# Patient Record
Sex: MALE | Race: WHITE | Employment: STUDENT | URBAN - METROPOLITAN AREA
[De-identification: names, ages, dates, MRNs, and addresses within clinical notes are randomized per-mention and may not be internally consistent; named-entity substitution may affect disease eponyms.]

---
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
## 2022-03-03 ENCOUNTER — OFFICE VISIT (OUTPATIENT)
Dept: FAMILY MEDICINE CLINIC | Facility: CLINIC | Age: 22
End: 2022-03-03
Payer: MEDICAID

## 2022-03-03 VITALS — BODY MASS INDEX: 27.09 KG/M2 | TEMPERATURE: 98 F | WEIGHT: 200 LBS | HEIGHT: 72 IN | RESPIRATION RATE: 16 BRPM

## 2022-03-03 DIAGNOSIS — L42 PITYRIASIS ROSEA: Primary | ICD-10-CM

## 2022-03-03 PROCEDURE — 3008F BODY MASS INDEX DOCD: CPT | Performed by: NURSE PRACTITIONER

## 2022-03-03 PROCEDURE — 99203 OFFICE O/P NEW LOW 30 MIN: CPT | Performed by: NURSE PRACTITIONER

## 2022-03-03 RX ORDER — VALACYCLOVIR HYDROCHLORIDE 1 G/1
1000 TABLET, FILM COATED ORAL EVERY 12 HOURS SCHEDULED
Qty: 14 TABLET | Refills: 0 | Status: SHIPPED | OUTPATIENT
Start: 2022-03-03 | End: 2022-03-10

## 2022-03-17 ENCOUNTER — OFFICE VISIT (OUTPATIENT)
Dept: FAMILY MEDICINE CLINIC | Facility: CLINIC | Age: 22
End: 2022-03-17
Payer: MEDICAID

## 2022-03-17 VITALS
WEIGHT: 200 LBS | SYSTOLIC BLOOD PRESSURE: 146 MMHG | HEART RATE: 96 BPM | HEIGHT: 74 IN | TEMPERATURE: 99 F | RESPIRATION RATE: 18 BRPM | BODY MASS INDEX: 25.67 KG/M2 | DIASTOLIC BLOOD PRESSURE: 98 MMHG | OXYGEN SATURATION: 98 %

## 2022-03-17 DIAGNOSIS — L42 PITYRIASIS ROSEA: Primary | ICD-10-CM

## 2022-03-17 PROCEDURE — 99213 OFFICE O/P EST LOW 20 MIN: CPT | Performed by: NURSE PRACTITIONER

## 2022-03-17 PROCEDURE — 3080F DIAST BP >= 90 MM HG: CPT | Performed by: NURSE PRACTITIONER

## 2022-03-17 PROCEDURE — 3008F BODY MASS INDEX DOCD: CPT | Performed by: NURSE PRACTITIONER

## 2022-03-17 PROCEDURE — 3077F SYST BP >= 140 MM HG: CPT | Performed by: NURSE PRACTITIONER

## 2025-01-25 ENCOUNTER — APPOINTMENT (OUTPATIENT)
Dept: ULTRASOUND IMAGING | Facility: HOSPITAL | Age: 25
End: 2025-01-25
Attending: EMERGENCY MEDICINE

## 2025-01-25 ENCOUNTER — HOSPITAL ENCOUNTER (EMERGENCY)
Facility: HOSPITAL | Age: 25
Discharge: HOME | End: 2025-01-25
Attending: EMERGENCY MEDICINE

## 2025-01-25 VITALS
WEIGHT: 200 LBS | OXYGEN SATURATION: 99 % | RESPIRATION RATE: 20 BRPM | BODY MASS INDEX: 25.67 KG/M2 | SYSTOLIC BLOOD PRESSURE: 138 MMHG | HEART RATE: 76 BPM | DIASTOLIC BLOOD PRESSURE: 68 MMHG | HEIGHT: 74 IN | TEMPERATURE: 98 F

## 2025-01-25 DIAGNOSIS — N45.3 EPIDIDYMO-ORCHITIS: Primary | ICD-10-CM

## 2025-01-25 LAB
BILIRUB UR QL STRIP.AUTO: NEGATIVE
CLARITY UR REFRACT.AUTO: CLEAR
GLUCOSE UR STRIP.AUTO-MCNC: NORMAL MG/DL
KETONES UR STRIP.AUTO-MCNC: NEGATIVE MG/DL
LEUKOCYTE ESTERASE UR QL STRIP.AUTO: NEGATIVE
NITRITE UR QL STRIP.AUTO: NEGATIVE
PH UR STRIP.AUTO: 6.5 [PH] (ref 5–8)
PROT UR STRIP.AUTO-MCNC: NEGATIVE MG/DL
RBC UR QL AUTO: NEGATIVE
SP GR UR STRIP.AUTO: 1.01 (ref 1–1.03)
UROBILINOGEN UR STRIP.AUTO-MCNC: NORMAL MG/DL

## 2025-01-25 PROCEDURE — 96372 THER/PROPH/DIAG INJ SC/IM: CPT

## 2025-01-25 PROCEDURE — 81003 URINALYSIS AUTO W/O SCOPE: CPT | Performed by: EMERGENCY MEDICINE

## 2025-01-25 PROCEDURE — 76870 US EXAM SCROTUM: CPT | Performed by: EMERGENCY MEDICINE

## 2025-01-25 PROCEDURE — 99284 EMERGENCY DEPT VISIT MOD MDM: CPT

## 2025-01-25 PROCEDURE — 93975 VASCULAR STUDY: CPT | Performed by: EMERGENCY MEDICINE

## 2025-01-25 RX ORDER — DOXYCYCLINE 100 MG/1
100 CAPSULE ORAL 2 TIMES DAILY
Qty: 20 CAPSULE | Refills: 0 | Status: SHIPPED | OUTPATIENT
Start: 2025-01-25 | End: 2025-02-04

## 2025-01-25 NOTE — ED PROVIDER NOTES
Patient Seen in: [facility] Emergency Department      History     Chief Complaint   Patient presents with    Eval-G     Stated Complaint: Testicular pain    Subjective:   HPI      This is a 24-year-old male no significant past medical history who presents with left testicle pain which she states has been present for 2 weeks was got increasingly worse over the last week.  No penile discharge.  No dysuria.  No fevers.  He has not been sexually active for 2 years.  No history of STDs.  He has not seen anybody for the symptoms.  He presents here today for further evaluation.    Objective:     History reviewed. No pertinent past medical history.           History reviewed. No pertinent surgical history.             Social History     Socioeconomic History    Marital status: Single   Tobacco Use    Smoking status: Never    Smokeless tobacco: Never   Vaping Use    Vaping status: Never Used   Substance and Sexual Activity    Alcohol use: Never    Drug use: Yes     Types: Cannabis                  Physical Exam     ED Triage Vitals   BP 01/25/25 0953 150/90   Pulse 01/25/25 0952 81   Resp 01/25/25 0952 20   Temp 01/25/25 0952 97.8 °F (36.6 °C)   Temp src 01/25/25 0952 Oral   SpO2 01/25/25 0952 98 %   O2 Device 01/25/25 0952 None (Room air)       Current Vitals:   Vital Signs  BP: 150/90  Pulse: 81  Resp: 20  Temp: 97.8 °F (36.6 °C)  Temp src: Oral    Oxygen Therapy  SpO2: 98 %  O2 Device: None (Room air)        Physical Exam  GENERAL: Awake, alert oriented x3, nontoxic appearing.   SKIN: Normal, warm, and dry.  ABDOMEN: Soft, nontender and nondistended. Normoactive bowel sounds. No rebound. No guarding.   : Uncircumcised male.  Left testicle lies slightly lower than right.  There is tenderness to palpation directly over the mid left testicle.  No pain to palpation of right testicle.  No penile discharge.  No inguinal lymphadenopathy.  EXTREMITIES: Warm with brisk capillary refill.         ED Course     Labs Reviewed    URINALYSIS WITH CULTURE REFLEX          US SCROTUM W/ DOPPLER (CPT=93975/04857)    Result Date: 1/25/2025  PROCEDURE:  US SCROTUM W/ DOPPLER (CPT=93975/85464)  COMPARISON:  None.  INDICATIONS:  Testicular pain  TECHNIQUE:  Real time grey scale ultrasound was performed of the scrotal contents including the testicles, epididymis, spermatic cord, and scrotal wall. A duplex scan with B-mode, Doppler color flow, and spectral analysis were also performed.  PATIENT STATED HISTORY: (As transcribed by Technologist)     FINDINGS:  TESTES:  Right testis measures 4.9 x 2.6 x 2.4 cm.  Left testis measures 4.8 x 2.6 x 2.5 cm.  There is asymmetric increased flow to the left testis in relation to the right concerning for epididymal orchitis. EPIDIDYMIS:  Hyperemic left epididymis. HYDROCELE:  Small left-sided hydrocele VARICOCELE:  Left-sided varicocele            CONCLUSION:  Left-sided epididymal orchitis with small left hydrocele.  No evidence of testicular torsion.  Left-sided varicocele.   LOCATION:  [facility]    Dictated by (CST): Melissa Awad MD on 1/25/2025 at 11:14 AM     Finalized by (CST): Melissa Awad MD on 1/25/2025 at 11:16 AM              MDM          This is a 24-year-old male no significant past medical history who presents with left testicle pain which she states has been present for 2 weeks was got increasingly worse over the last week.  No penile discharge.  No dysuria.  No fevers.  He is currently not sexually active.  No history of STDs.  Differential includes testicular torsion, orchitis, epididymitis, varicocele, hydrocele.      Testicular ultrasound was obtained with Doppler and demonstrated left-sided epididymal orchitis with small left hydrocele.    Urinalysis: Negative    Findings were discussed with patient.  It he has not been sexually active for 2 years.  Doubt STD.  However, he was given Rocephin IM and will placed on Doxy for 10 days.  Scrotal support.  Ibuprofen alternating with Tylenol.  Good  scrotal support.  Cool compresses.  Return if worse.  Follow-up with urology, call on Monday for an appointment.  Patient was discharged home in good condition.      Disposition and Plan     Clinical Impression:  1. Epididymo-orchitis         Disposition:  Discharge  1/25/2025 11:47 am    Follow-up:  [facility]   [address] [facility] [address]  [phone]  Follow up on 1/27/2025            Medications Prescribed:  Current Discharge Medication List        START taking these medications    Details   doxycycline 100 MG Oral Cap Take 1 capsule (100 mg total) by mouth 2 (two) times daily for 10 days.  Qty: 20 capsule, Refills: 0                 Supplementary Documentation:

## 2025-01-25 NOTE — ED INITIAL ASSESSMENT (HPI)
Pt here with c/o left sided testicular pain. Pt states the pain started 1 week ago. Pt denies any injury. Pt denies nausea and vomiting.

## (undated) NOTE — ED AVS SNAPSHOT
[facility] Emergency Department  [facility]  [address] [address]  Phone:  [phone]  Fax:  [phone]          Ivan Flynn   MRN: [id number]    Department:  [facility] Emergency Department   Date of Visit:  7/7/2017 CARE PHYSICIAN AT ONCE OR RETURN IMMEDIATELY TO THE EMERGENCY DEPARTMENT.     If you have been prescribed any medication(s), please fill your prescription right away and begin taking the medication(s) as directed    If the emergency physician has read X-ray